# Patient Record
Sex: FEMALE | Race: WHITE | NOT HISPANIC OR LATINO | Employment: FULL TIME | URBAN - METROPOLITAN AREA
[De-identification: names, ages, dates, MRNs, and addresses within clinical notes are randomized per-mention and may not be internally consistent; named-entity substitution may affect disease eponyms.]

---

## 2022-08-31 ENCOUNTER — OFFICE VISIT (OUTPATIENT)
Dept: PULMONOLOGY | Facility: CLINIC | Age: 52
End: 2022-08-31
Payer: COMMERCIAL

## 2022-08-31 VITALS
DIASTOLIC BLOOD PRESSURE: 72 MMHG | HEIGHT: 67 IN | WEIGHT: 131.63 LBS | OXYGEN SATURATION: 99 % | BODY MASS INDEX: 20.66 KG/M2 | HEART RATE: 104 BPM | SYSTOLIC BLOOD PRESSURE: 105 MMHG

## 2022-08-31 DIAGNOSIS — J82.83 EOSINOPHILIC ASTHMA: ICD-10-CM

## 2022-08-31 DIAGNOSIS — J45.50 SEVERE PERSISTENT ASTHMA WITHOUT COMPLICATION: Primary | ICD-10-CM

## 2022-08-31 PROCEDURE — 99204 OFFICE O/P NEW MOD 45 MIN: CPT | Mod: S$GLB,,, | Performed by: INTERNAL MEDICINE

## 2022-08-31 PROCEDURE — 99204 PR OFFICE/OUTPT VISIT, NEW, LEVL IV, 45-59 MIN: ICD-10-PCS | Mod: S$GLB,,, | Performed by: INTERNAL MEDICINE

## 2022-08-31 PROCEDURE — 99999 PR PBB SHADOW E&M-NEW PATIENT-LVL III: ICD-10-PCS | Mod: PBBFAC,,, | Performed by: INTERNAL MEDICINE

## 2022-08-31 PROCEDURE — 99999 PR PBB SHADOW E&M-NEW PATIENT-LVL III: CPT | Mod: PBBFAC,,, | Performed by: INTERNAL MEDICINE

## 2022-08-31 RX ORDER — CELECOXIB 100 MG/1
100 CAPSULE ORAL 2 TIMES DAILY PRN
Qty: 60 CAPSULE | Refills: 5 | Status: SHIPPED | OUTPATIENT
Start: 2022-08-31

## 2022-08-31 RX ORDER — DICYCLOMINE HYDROCHLORIDE 20 MG/1
20 TABLET ORAL
COMMUNITY
Start: 2022-05-31

## 2022-08-31 RX ORDER — TIOTROPIUM BROMIDE INHALATION SPRAY 1.56 UG/1
2.5 SPRAY, METERED RESPIRATORY (INHALATION) DAILY
Qty: 12 G | Refills: 3 | Status: SHIPPED | OUTPATIENT
Start: 2022-08-31 | End: 2023-06-28

## 2022-08-31 RX ORDER — CLONAZEPAM 0.5 MG/1
0.5 TABLET ORAL NIGHTLY
COMMUNITY
Start: 2022-08-12 | End: 2022-11-10

## 2022-08-31 RX ORDER — PREDNISONE 20 MG/1
TABLET ORAL
Qty: 12 TABLET | Refills: 0 | Status: SHIPPED | OUTPATIENT
Start: 2022-08-31 | End: 2022-12-21 | Stop reason: SDUPTHER

## 2022-08-31 RX ORDER — ALBUTEROL SULFATE 90 UG/1
2 AEROSOL, METERED RESPIRATORY (INHALATION) EVERY 4 HOURS PRN
Qty: 108 G | Refills: 3 | Status: SHIPPED | OUTPATIENT
Start: 2022-08-31 | End: 2023-06-28 | Stop reason: SDUPTHER

## 2022-08-31 RX ORDER — OMEPRAZOLE 40 MG/1
40 CAPSULE, DELAYED RELEASE ORAL DAILY
COMMUNITY

## 2022-08-31 RX ORDER — IPRATROPIUM BROMIDE AND ALBUTEROL SULFATE 2.5; .5 MG/3ML; MG/3ML
3 SOLUTION RESPIRATORY (INHALATION) 4 TIMES DAILY
COMMUNITY
Start: 2022-07-16 | End: 2022-08-31 | Stop reason: SDUPTHER

## 2022-08-31 RX ORDER — FLUTICASONE PROPIONATE AND SALMETEROL XINAFOATE 230; 21 UG/1; UG/1
2 AEROSOL, METERED RESPIRATORY (INHALATION) 2 TIMES DAILY
Qty: 12 G | Refills: 11 | Status: SHIPPED | OUTPATIENT
Start: 2022-08-31 | End: 2022-11-28 | Stop reason: SDUPTHER

## 2022-08-31 RX ORDER — ALBUTEROL SULFATE 90 UG/1
2 AEROSOL, METERED RESPIRATORY (INHALATION) EVERY 6 HOURS PRN
COMMUNITY
Start: 2022-07-15 | End: 2022-08-31 | Stop reason: SDUPTHER

## 2022-08-31 RX ORDER — METHOCARBAMOL 500 MG/1
500-1000 TABLET, FILM COATED ORAL
COMMUNITY
Start: 2022-08-10 | End: 2023-12-28

## 2022-08-31 RX ORDER — IBUPROFEN 800 MG/1
800 TABLET ORAL EVERY 6 HOURS PRN
COMMUNITY
Start: 2022-05-10 | End: 2022-08-31

## 2022-08-31 RX ORDER — PEN NEEDLE, DIABETIC 31 GX5/16"
1 NEEDLE, DISPOSABLE MISCELLANEOUS 3 TIMES DAILY
COMMUNITY
Start: 2022-02-16

## 2022-08-31 RX ORDER — IPRATROPIUM BROMIDE AND ALBUTEROL SULFATE 2.5; .5 MG/3ML; MG/3ML
3 SOLUTION RESPIRATORY (INHALATION) EVERY 4 HOURS PRN
Qty: 240 EACH | Refills: 11 | Status: SHIPPED | OUTPATIENT
Start: 2022-08-31 | End: 2023-06-28 | Stop reason: SDUPTHER

## 2022-08-31 NOTE — PATIENT INSTRUCTIONS
Need controller-- advair covered - use 2 twice daily with spacer--     With good response to ipratropium would use spiriva 2 puffs daily    Use rescue with albuterol 2 puffs-- may need up to 4 puffs to mimic nebulizer dose-- use every 4 hrs as needed.  Use nebulizer as needed or albuterol puffer.    Use prednisone action plan for not controlled asthma-- needing excess albuterol and not responding well.     Would check blood work    Would check breathing test.      You have severe persistent asthma -- if cannot control would consider biologics.     Would recommend using celebrex in place of motrin as motrin may worsen asthma.    3 month recheck

## 2022-08-31 NOTE — PROGRESS NOTES
8/31/2022    Zunilda Phan  Patient Seen Years Ago And Here For Evaluation    Chief Complaint   Patient presents with    Asthma     Pt states that its acting up.    Cough     After using the nebulizer pt states that she coughs up white mucus.    Shortness of Breath     All the time. Pt states that she has a tight feeling like she can not breath.       HPI:works  , lives [place] last month,  uses nebulizer 4-6 times daily, [facility]  Singulair no help.  Sinuses not great with good sense smell.  Has dogs but allergic to cats and grasses.  Pt has nocturnal arousals 2/month.  Has daily symptoms.  The patient does not wheeze too much.  She has chest tightness and shortness of breath and cough in.    Patient has developed some gyn discomfort related inhaled steroid use in the past.     Eosinophils were 400 in 2019 on review of record.    The patient has nocturnal diffuse arthralgias the end discomfort.  She used uses Klonopin and ibuprofen for sleep.  She takes 100 mg of ibuprofen at bedtime.  She has not clearly noted that nonsteroidals induce or worsen sinuses or asthma.        PFSH:  No past medical history on file.      No past surgical history on file.  Social History     Tobacco Use    Smoking status: Never    Smokeless tobacco: Never     No family history on file.  Review of patient's allergies indicates:   Allergen Reactions    Corticosteroids (glucocorticoids) Other (See Comments)     Cramping/vag irritation       Performance Status:The patient's activity level is functions out of house.      Review of Systems:  a review of eleven systems covering constitutional, Eye, HEENT, Psych, Respiratory, Cardiac, GI, , Musculoskeletal, Endocrine, Dermatologic was negative except for pertinent findings as listed ABOVE and below: pertinent positive as above, rest is good     Exam:Comprehensive exam done. /72 (BP Location: Right arm,  "Patient Position: Sitting, BP Method: Medium (Automatic))   Pulse 104   Ht 5' 7" (1.702 m)   Wt 59.7 kg (131 lb 9.8 oz)   SpO2 99% Comment: room air at rest  BMI 20.61 kg/m²   Exam included Vitals as listed, and patient's appearance and affect and alertness and mood, oral exam for yeast and hygiene and pharynx lesions and Mallapatti (M) score, neck with inspection for jvd and masses and thyroid abnormalities and lymph nodes (supraclavicular and infraclavicular nodes and axillary also examined and noted if abn), chest exam included symmetry and effort and fremitus and percussion and auscultation, cardiac exam included rhythm and gallops and murmur and rubs and jvd and edema, abdominal exam for mass and hepatosplenomegaly and tenderness and hernias and bowel sounds, Musculoskeletal exam with muscle tone and posture and mobility/gait and  strength, and skin for rashes and cyanosis and pallor and turgor, extremity for clubbing.  Findings were normal except for pertinent findings listed below:   M1, chest is symmetric, no distress, normal percussion, normal fremitus and good normal breath sounds , noclubbingnor edema         Radiographs (ct chest and cxr) reviewed: view by direct vision  ct abd viewed directly and lung bases clear.    Labs reviewed            PFT will be done and results to be reviewed       Plan:  Clinical impression is apparently straight forward and impression with management as below.    Zunilda was seen today for asthma, cough and shortness of breath.    Diagnoses and all orders for this visit:    Severe persistent asthma without complication  -     fluticasone-salmeterol 230-21 mcg/dose (ADVAIR HFA) 230-21 mcg/actuation HFAA inhaler; Inhale 2 puffs into the lungs 2 (two) times daily. Controller  -     Complete PFT with bronchodilator; Future  -     albuterol (PROVENTIL/VENTOLIN HFA) 90 mcg/actuation inhaler; Inhale 2 puffs into the lungs every 4 (four) hours as needed for Wheezing or " Shortness of Breath.  -     tiotropium bromide (SPIRIVA RESPIMAT) 1.25 mcg/actuation inhaler; Inhale 2 puffs into the lungs once daily. Controller  -     albuterol-ipratropium (DUO-NEB) 2.5 mg-0.5 mg/3 mL nebulizer solution; Take 3 mLs by nebulization every 4 (four) hours as needed for Wheezing.  -     celecoxib (CELEBREX) 100 MG capsule; Take 1 capsule (100 mg total) by mouth 2 (two) times daily as needed for Pain.  -     predniSONE (DELTASONE) 20 MG tablet; Take one daily for 3 days and may repeat for shortness of breath.  -     CBC auto differential; Future    Eosinophilic asthma  -     fluticasone-salmeterol 230-21 mcg/dose (ADVAIR HFA) 230-21 mcg/actuation HFAA inhaler; Inhale 2 puffs into the lungs 2 (two) times daily. Controller  -     Complete PFT with bronchodilator; Future  -     albuterol (PROVENTIL/VENTOLIN HFA) 90 mcg/actuation inhaler; Inhale 2 puffs into the lungs every 4 (four) hours as needed for Wheezing or Shortness of Breath.  -     tiotropium bromide (SPIRIVA RESPIMAT) 1.25 mcg/actuation inhaler; Inhale 2 puffs into the lungs once daily. Controller  -     albuterol-ipratropium (DUO-NEB) 2.5 mg-0.5 mg/3 mL nebulizer solution; Take 3 mLs by nebulization every 4 (four) hours as needed for Wheezing.  -     celecoxib (CELEBREX) 100 MG capsule; Take 1 capsule (100 mg total) by mouth 2 (two) times daily as needed for Pain.  -     predniSONE (DELTASONE) 20 MG tablet; Take one daily for 3 days and may repeat for shortness of breath.  -     CBC auto differential; Future      Follow up in about 3 months (around 11/30/2022).    Discussed with patient above for education the following:      Patient Instructions   Need controller-- advair covered - use 2 twice daily with spacer--     With good response to ipratropium would use spiriva 2 puffs daily    Use rescue with albuterol 2 puffs-- may need up to 4 puffs to mimic nebulizer dose-- use every 4 hrs as needed.  Use nebulizer as needed or albuterol  jama.    Use prednisone action plan for not controlled asthma-- needing excess albuterol and not responding well.     Would check blood work    Would check breathing test.      You have severe persistent asthma -- if cannot control would consider biologics.     Would recommend using celebrex in place of motrin as motrin may worsen asthma.    3 month recheck

## 2022-11-28 ENCOUNTER — OFFICE VISIT (OUTPATIENT)
Dept: PULMONOLOGY | Facility: CLINIC | Age: 52
End: 2022-11-28
Payer: COMMERCIAL

## 2022-11-28 ENCOUNTER — LAB VISIT (OUTPATIENT)
Dept: LAB | Facility: HOSPITAL | Age: 52
End: 2022-11-28
Attending: INTERNAL MEDICINE
Payer: COMMERCIAL

## 2022-11-28 VITALS
SYSTOLIC BLOOD PRESSURE: 120 MMHG | WEIGHT: 141.13 LBS | HEIGHT: 67 IN | HEART RATE: 86 BPM | OXYGEN SATURATION: 98 % | DIASTOLIC BLOOD PRESSURE: 72 MMHG | BODY MASS INDEX: 22.15 KG/M2

## 2022-11-28 DIAGNOSIS — J45.909 STEROID-DEPENDENT ASTHMA, UNSPECIFIED ASTHMA SEVERITY, UNSPECIFIED WHETHER COMPLICATED, UNSPECIFIED WHETHER PERSISTENT: ICD-10-CM

## 2022-11-28 DIAGNOSIS — J82.83 EOSINOPHILIC ASTHMA: ICD-10-CM

## 2022-11-28 DIAGNOSIS — J45.50 SEVERE PERSISTENT ASTHMA WITHOUT COMPLICATION: ICD-10-CM

## 2022-11-28 DIAGNOSIS — J45.50 SEVERE PERSISTENT ASTHMA WITHOUT COMPLICATION: Primary | ICD-10-CM

## 2022-11-28 DIAGNOSIS — M85.80 OSTEOPENIA, UNSPECIFIED LOCATION: ICD-10-CM

## 2022-11-28 LAB
BASOPHILS # BLD AUTO: 0.14 K/UL (ref 0–0.2)
BASOPHILS NFR BLD: 1.3 % (ref 0–1.9)
DIFFERENTIAL METHOD: ABNORMAL
EOSINOPHIL # BLD AUTO: 0.5 K/UL (ref 0–0.5)
EOSINOPHIL NFR BLD: 4.2 % (ref 0–8)
ERYTHROCYTE [DISTWIDTH] IN BLOOD BY AUTOMATED COUNT: 11.7 % (ref 11.5–14.5)
HCT VFR BLD AUTO: 35.5 % (ref 37–48.5)
HGB BLD-MCNC: 11.7 G/DL (ref 12–16)
IMM GRANULOCYTES # BLD AUTO: 0.14 K/UL (ref 0–0.04)
IMM GRANULOCYTES NFR BLD AUTO: 1.3 % (ref 0–0.5)
LYMPHOCYTES # BLD AUTO: 2.1 K/UL (ref 1–4.8)
LYMPHOCYTES NFR BLD: 18.5 % (ref 18–48)
MCH RBC QN AUTO: 30.4 PG (ref 27–31)
MCHC RBC AUTO-ENTMCNC: 33 G/DL (ref 32–36)
MCV RBC AUTO: 92 FL (ref 82–98)
MONOCYTES # BLD AUTO: 0.9 K/UL (ref 0.3–1)
MONOCYTES NFR BLD: 8.5 % (ref 4–15)
NEUTROPHILS # BLD AUTO: 7.4 K/UL (ref 1.8–7.7)
NEUTROPHILS NFR BLD: 66.2 % (ref 38–73)
NRBC BLD-RTO: 0 /100 WBC
PLATELET # BLD AUTO: 321 K/UL (ref 150–450)
PMV BLD AUTO: 9.8 FL (ref 9.2–12.9)
RBC # BLD AUTO: 3.85 M/UL (ref 4–5.4)
WBC # BLD AUTO: 11.11 K/UL (ref 3.9–12.7)

## 2022-11-28 PROCEDURE — 99215 OFFICE O/P EST HI 40 MIN: CPT | Mod: S$GLB,,, | Performed by: INTERNAL MEDICINE

## 2022-11-28 PROCEDURE — 99215 PR OFFICE/OUTPT VISIT, EST, LEVL V, 40-54 MIN: ICD-10-PCS | Mod: S$GLB,,, | Performed by: INTERNAL MEDICINE

## 2022-11-28 PROCEDURE — 99999 PR PBB SHADOW E&M-EST. PATIENT-LVL IV: ICD-10-PCS | Mod: PBBFAC,,, | Performed by: INTERNAL MEDICINE

## 2022-11-28 PROCEDURE — 85025 COMPLETE CBC W/AUTO DIFF WBC: CPT | Performed by: INTERNAL MEDICINE

## 2022-11-28 PROCEDURE — 36415 COLL VENOUS BLD VENIPUNCTURE: CPT | Performed by: INTERNAL MEDICINE

## 2022-11-28 PROCEDURE — 99999 PR PBB SHADOW E&M-EST. PATIENT-LVL IV: CPT | Mod: PBBFAC,,, | Performed by: INTERNAL MEDICINE

## 2022-11-28 RX ORDER — FLUCONAZOLE 200 MG/1
200 TABLET ORAL DAILY
Qty: 7 TABLET | Refills: 0 | Status: SHIPPED | OUTPATIENT
Start: 2022-11-28 | End: 2022-12-05

## 2022-11-28 RX ORDER — FLUTICASONE FUROATE, UMECLIDINIUM BROMIDE AND VILANTEROL TRIFENATATE 200; 62.5; 25 UG/1; UG/1; UG/1
1 POWDER RESPIRATORY (INHALATION) DAILY
Qty: 60 EACH | Refills: 11 | Status: SHIPPED | OUTPATIENT
Start: 2022-11-28 | End: 2023-06-28

## 2022-11-28 RX ORDER — FLUTICASONE PROPIONATE AND SALMETEROL XINAFOATE 230; 21 UG/1; UG/1
2 AEROSOL, METERED RESPIRATORY (INHALATION) 2 TIMES DAILY
Qty: 36 G | Refills: 3 | Status: SHIPPED | OUTPATIENT
Start: 2022-11-28 | End: 2023-06-28

## 2022-11-28 NOTE — PROGRESS NOTES
11/28/2022    Zunilda Phan  Patient Seen Years Ago And Here For Evaluation    Chief Complaint   Patient presents with    Shortness of Breath     Pt states always     Cough     Pt states she coughs up white mucus          HPI:  11/28/2022-- used advair but not felt to effective in prevention  as needed freq steroids.  Pth having chest tightness -- no large benefit.  Has thick white mucous from lungs.    Has cramps feet-   Pt using samples controller.  Pt has taken 4 courses prednisone since last visit.   Pt had osteopenia on bone density [facility] 2 yrs ago.   Pt had not had premature change life.          8/231/2022 works  , lives [place] last month,  uses nebulizer 4-6 times daily, was seen [facility] a couple years ago, and dupixent recommended.  Singulair no help.  Sinuses not great with good sense smell.  Has dogs but allergic to cats and grasses.  Pt has nocturnal arousals 2/month.  Has daily symptoms.  The patient does not wheeze too much.  She has chest tightness and shortness of breath and cough in.    Patient has developed some gyn discomfort related inhaled steroid use in the past.     Eosinophils were 400 in 2019 on review of record.    The patient has nocturnal diffuse arthralgias.   She used uses Klonopin and ibuprofen for sleep.  She takes 100 mg of ibuprofen at bedtime.  She has not clearly noted that nonsteroidals induce or worsen sinuses or asthma.    Would check breathing test.      You have severe persistent asthma -- if cannot control would consider biologics.     Would recommend using celebrex in place of motrin as motrin may worsen asthma.    3 month recheck      PFSH:  No past medical history on file.      No past surgical history on file.  Social History     Tobacco Use    Smoking status: Never    Smokeless tobacco: Never     No family history on file.  Review of patient's allergies indicates:   Allergen Reactions    Corticosteroids (glucocorticoids) Other  "(See Comments)     Cramping/vag irritation       Performance Status:The patient's activity level is functions out of house.      Review of Systems:  a review of eleven systems covering constitutional, Eye, HEENT, Psych, Respiratory, Cardiac, GI, , Musculoskeletal, Endocrine, Dermatologic was negative except for pertinent findings as listed ABOVE and below: pertinent positive as above, rest is good     Exam:Comprehensive exam done. /72 (BP Location: Left arm, Patient Position: Sitting, BP Method: Medium (Automatic))   Pulse 86   Ht 5' 7" (1.702 m)   Wt 64 kg (141 lb 1.5 oz)   SpO2 98% Comment: room air at rest  BMI 22.10 kg/m²   Exam included Vitals as listed, and patient's appearance and affect and alertness and mood, oral exam for yeast and hygiene and pharynx lesions and Mallapatti (M) score, neck with inspection for jvd and masses and thyroid abnormalities and lymph nodes (supraclavicular and infraclavicular nodes and axillary also examined and noted if abn), chest exam included symmetry and effort and fremitus and percussion and auscultation, cardiac exam included rhythm and gallops and murmur and rubs and jvd and edema, abdominal exam for mass and hepatosplenomegaly and tenderness and hernias and bowel sounds, Musculoskeletal exam with muscle tone and posture and mobility/gait and  strength, and skin for rashes and cyanosis and pallor and turgor, extremity for clubbing.  Findings were normal except for pertinent findings listed below:   M1, chest is symmetric, no distress, normal percussion, normal fremitus and good normal breath sounds , no clubbing nor edema         Radiographs (ct chest and cxr) reviewed: view by direct vision  ct abd viewed directly and lung bases clear.    Labs reviewed            PFT will be done and results to be reviewed       Plan:  Clinical impression is apparently straight forward and impression with management as below.    Zunilda was seen today for shortness of " breath and cough.    Diagnoses and all orders for this visit:    Severe persistent asthma without complication  -     fluticasone-salmeterol 230-21 mcg/dose (ADVAIR HFA) 230-21 mcg/actuation HFAA inhaler; Inhale 2 puffs into the lungs 2 (two) times daily. Controller  -     CBC auto differential; Future  -     fluticasone-umeclidin-vilanter (TRELEGY ELLIPTA) 200-62.5-25 mcg inhaler; Inhale 1 puff into the lungs once daily.  -     dupilumab 300 mg/2 mL PnIj; Inject 300 mg into the skin every 14 (fourteen) days.    Eosinophilic asthma  -     fluticasone-salmeterol 230-21 mcg/dose (ADVAIR HFA) 230-21 mcg/actuation HFAA inhaler; Inhale 2 puffs into the lungs 2 (two) times daily. Controller    Osteopenia, unspecified location  -     DXA Bone Density Spine And Hip; Future  -     fluconazole (DIFLUCAN) 200 MG Tab; Take 1 tablet (200 mg total) by mouth once daily. for 7 days    Steroid-dependent asthma, unspecified asthma severity, unspecified whether complicated, unspecified whether persistent  -     DXA Bone Density Spine And Hip; Future  -     dupilumab 300 mg/2 mL PnIj; Inject 300 mg into the skin every 14 (fourteen) days.        Follow up in about 2 weeks (around 12/12/2022), or if symptoms worsen or fail to improve.    Discussed with patient above for education the following:      Patient Instructions   May use trelegy (samples) in place of advair-- we discuss and would recommend continuing mdi advair with space twice daily.      If yeast infection from steroids -- may use diflucan if any question.      Your asthma control looks steroid dependant but steroids ppt side effects limiting steroid dosing.    You have had osteopenia based on prior bone density-- would repeat bone density and try to avoid steroids best able.     Will dose dupixent 600 mg today - sampled--, then 300 every 2  wks.    Need 2 wks f/u for dupixent and 3 month.        Eval took 40 min

## 2022-11-28 NOTE — PATIENT INSTRUCTIONS
May use trelegy (samples) in place of advair-- we discuss and would recommend continuing mdi advair with space twice daily.      If yeast infection from steroids -- may use diflucan if any question.      Your asthma control looks steroid dependant but steroids ppt side effects limiting steroid dosing.    You have had osteopenia based on prior bone density-- would repeat bone density and try to avoid steroids best able.     Will dose dupixent 600 mg today - sampled--, then 300 every 2  wks.    Need 2 wks f/u for dupixent and 3 month.

## 2022-11-28 NOTE — PROGRESS NOTES
Administered dupixent starter dose sample  SQ.   NDC- 5218-3256-71  LOT- [id number]  Exp- 2023-10-31  Patient tolerated well. No bleeding at insertion site noted. Pain scale 0/10 with injection. 2 patient identifiers used (name, ), aseptic technique maintained.

## 2022-11-29 ENCOUNTER — SPECIALTY PHARMACY (OUTPATIENT)
Dept: PHARMACY | Facility: CLINIC | Age: 52
End: 2022-11-29
Payer: COMMERCIAL

## 2022-11-29 NOTE — TELEPHONE ENCOUNTER
Incoming call from patient returning call to [facility]. Informed pt that PA was submitted and once approved [facility] will follow up. Pt voiced understanding.

## 2022-11-29 NOTE — TELEPHONE ENCOUNTER
PA submitted via epic. Case ID: [id number]    Chelo, this is Ronda Ford with [facility].  We are working on your prescription that your doctor has sent us. We will be working with your insurance to get this approved for you. We will be calling you along the way with updates on your medication.  If you have any questions, you can reach us at [phone].    Welcome call outcome: Left voicemail

## 2022-12-01 NOTE — TELEPHONE ENCOUNTER
PA approved. CaseId:[id number];Status:Approved;Review Type:Prior Auth;Coverage Start Date:10/30/2022;Coverage End Date:05/28/2023    [facility] is OON. Pt  locked into filling at [facility].     Outgoing call to pt to inform  of approval and provided phone number to [facility] to reach out for delivery status.  Pt verbalized understanding and had no other questions.

## 2022-12-09 ENCOUNTER — TELEPHONE (OUTPATIENT)
Dept: PULMONOLOGY | Facility: CLINIC | Age: 52
End: 2022-12-09
Payer: COMMERCIAL

## 2022-12-09 NOTE — TELEPHONE ENCOUNTER
Returned call to pharmacy. Advised pharmacist ok to dispense a 90 day supply with 3 refills. TORB given.

## 2022-12-09 NOTE — TELEPHONE ENCOUNTER
----- Message from Guerline Sawant, Patient Care Assistant sent at 12/9/2022  3:53 PM CST -----  Contact: Prema Daley  [facility] is calling to get a qty clarification on med Dupixent.  [phone]  ref [phone]  thanks

## 2022-12-21 ENCOUNTER — OFFICE VISIT (OUTPATIENT)
Dept: PULMONOLOGY | Facility: CLINIC | Age: 52
End: 2022-12-21
Payer: COMMERCIAL

## 2022-12-21 VITALS
SYSTOLIC BLOOD PRESSURE: 124 MMHG | OXYGEN SATURATION: 99 % | BODY MASS INDEX: 22.52 KG/M2 | HEART RATE: 75 BPM | DIASTOLIC BLOOD PRESSURE: 76 MMHG | HEIGHT: 67 IN | WEIGHT: 143.5 LBS

## 2022-12-21 DIAGNOSIS — J82.83 EOSINOPHILIC ASTHMA: ICD-10-CM

## 2022-12-21 DIAGNOSIS — J45.50 SEVERE PERSISTENT ASTHMA WITHOUT COMPLICATION: Primary | ICD-10-CM

## 2022-12-21 PROCEDURE — 99213 PR OFFICE/OUTPT VISIT, EST, LEVL III, 20-29 MIN: ICD-10-PCS | Mod: S$GLB,,, | Performed by: INTERNAL MEDICINE

## 2022-12-21 PROCEDURE — 99999 PR PBB SHADOW E&M-EST. PATIENT-LVL III: ICD-10-PCS | Mod: PBBFAC,,, | Performed by: INTERNAL MEDICINE

## 2022-12-21 PROCEDURE — 99213 OFFICE O/P EST LOW 20 MIN: CPT | Mod: S$GLB,,, | Performed by: INTERNAL MEDICINE

## 2022-12-21 PROCEDURE — 99999 PR PBB SHADOW E&M-EST. PATIENT-LVL III: CPT | Mod: PBBFAC,,, | Performed by: INTERNAL MEDICINE

## 2022-12-21 RX ORDER — PREDNISONE 20 MG/1
TABLET ORAL
Qty: 12 TABLET | Refills: 0 | Status: SHIPPED | OUTPATIENT
Start: 2022-12-21 | End: 2023-12-28 | Stop reason: SDUPTHER

## 2022-12-21 NOTE — PROGRESS NOTES
12/21/2022    Zunilda Phan  Patient Seen Years Ago And Here For Evaluation    Chief Complaint   Patient presents with    Follow-up         HPI:    12/21/2022-- got 2nd dupixent recently. Has advair, no prednisone , no yeast.  Pt not certain dupixent very effective yet -- suspects she is better.      Eos were 500 nov 28th nov and on dec 13th,.needs neb same 400.     11/28/2022-- used advair but not felt to effective in prevention  as needed freq steroids.  Pth having chest tightness -- no large benefit.  Has thick white mucous from lungs.    Has cramps feet-   Pt using samples controller.  Pt has taken 4 courses prednisone since last visit.   Pt had osteopenia on bone density [facility] 2 yrs ago.   Pt had not had premature change life.    Patient Instructions   May use trelegy (samples) in place of advair-- we discuss and would recommend continuing mdi advair with space twice daily.      If yeast infection from steroids -- may use diflucan if any question.      Your asthma control looks steroid dependant but steroids ppt side effects limiting steroid dosing.    You have had osteopenia based on prior bone density-- would repeat bone density and try to avoid steroids best able.     Will dose dupixent 600 mg today - sampled--, then 300 every 2  wks.    Need 2 wks f/u for dupixent and 3 month.        8/231/2022 works  , [place] last month,  uses nebulizer 4-6 times daily, was seen [facility] a couple years ago, and dupixent recommended.  Singulair no help.  Sinuses not great with good sense smell.  Has dogs but allergic to cats and grasses.  Pt has nocturnal arousals 2/month.  Has daily symptoms.  The patient does not wheeze too much.  She has chest tightness and shortness of breath and cough in.    Patient has developed some gyn discomfort related inhaled steroid use in the past.     Eosinophils were 400 in 2019 on review of record.    The patient has nocturnal diffuse arthralgias.  "  She used uses Klonopin and ibuprofen for sleep.  She takes 100 mg of ibuprofen at bedtime.  She has not clearly noted that nonsteroidals induce or worsen sinuses or asthma.    Would check breathing test.      You have severe persistent asthma -- if cannot control would consider biologics.     Would recommend using celebrex in place of motrin as motrin may worsen asthma.    3 month recheck      PFSH:  No past medical history on file.      No past surgical history on file.  Social History     Tobacco Use    Smoking status: Never    Smokeless tobacco: Never     No family history on file.  Review of patient's allergies indicates:   Allergen Reactions    Corticosteroids (glucocorticoids) Other (See Comments)     Cramping/vag irritation       Performance Status:The patient's activity level is functions out of house.      Review of Systems:  a review of eleven systems covering constitutional, Eye, HEENT, Psych, Respiratory, Cardiac, GI, , Musculoskeletal, Endocrine, Dermatologic was negative except for pertinent findings as listed ABOVE and below: pertinent positive as above, rest is good     Exam:Comprehensive exam done. /76 (BP Location: Left arm, Patient Position: Sitting, BP Method: Medium (Automatic))   Pulse 75   Ht 5' 7" (1.702 m)   Wt 65.1 kg (143 lb 8.3 oz)   SpO2 99% Comment: On room air at rest  BMI 22.48 kg/m²   Exam included Vitals as listed, and patient's appearance and affect and alertness and mood, oral exam for yeast and hygiene and pharynx lesions and Mallapatti (M) score, neck with inspection for jvd and masses and thyroid abnormalities and lymph nodes (supraclavicular and infraclavicular nodes and axillary also examined and noted if abn), chest exam included symmetry and effort and fremitus and percussion and auscultation, cardiac exam included rhythm and gallops and murmur and rubs and jvd and edema, abdominal exam for mass and hepatosplenomegaly and tenderness and hernias and bowel " sounds, Musculoskeletal exam with muscle tone and posture and mobility/gait and  strength, and skin for rashes and cyanosis and pallor and turgor, extremity for clubbing.  Findings were normal except for pertinent findings listed below:   M1, chest is symmetric, no distress, normal percussion, normal fremitus and good normal breath sounds , no clubbing nor edema         Radiographs (ct chest and cxr) reviewed: view by direct vision  ct abd viewed directly and lung bases clear.    Labs reviewed            PFT will be done and results to be reviewed       Plan:  Clinical impression is apparently straight forward and impression with management as below.    Zunilda was seen today for follow-up.    Diagnoses and all orders for this visit:    Severe persistent asthma without complication  -     predniSONE (DELTASONE) 20 MG tablet; Take one daily for 3 days and may repeat for shortness of breath.    Eosinophilic asthma  -     predniSONE (DELTASONE) 20 MG tablet; Take one daily for 3 days and may repeat for shortness of breath.            Follow up in about 6 months (around 6/21/2023).    Discussed with patient above for education the following:      Patient Instructions   Would continue management -- re eval 6 months.      Eval took 40 min

## 2023-05-15 DIAGNOSIS — J45.50 SEVERE PERSISTENT ASTHMA WITHOUT COMPLICATION: ICD-10-CM

## 2023-05-15 DIAGNOSIS — J45.909 STEROID-DEPENDENT ASTHMA, UNSPECIFIED ASTHMA SEVERITY, UNSPECIFIED WHETHER COMPLICATED, UNSPECIFIED WHETHER PERSISTENT: ICD-10-CM

## 2023-05-17 ENCOUNTER — TELEPHONE (OUTPATIENT)
Dept: PHARMACY | Facility: CLINIC | Age: 53
End: 2023-05-17
Payer: COMMERCIAL

## 2023-05-17 NOTE — TELEPHONE ENCOUNTER
Hello, this is Ronda Ford, clinical pharmacist with [facility] that is part of your care team.  We have begun working on your prescription that your doctor has sent us. Our next steps include:     Working with your insurance company to obtain approval for your medication  Working with you to ensure your medication is affordable     We will be calling you along the way with updates on your medication but if you have any concerns or receive information that you would like to discuss please reach us at [phone].    Welcome call outcome: Left voicemail    New order received to obtain PA renewal expiring on 5/28/2023.    Insurance presented question needing pt's reponse:  Has the patient responded to therapy, as determined by the prescriber? Please Note: Examples of a response to Dupixent therapy are decreased asthma exacerbations; decreased asthma symptoms; decreased hospitalizations, emergency department visits, or urgent care visits due to asthma; decreased requirement for oral corticosteroid therapy.    Please verify updated address.

## 2023-06-28 ENCOUNTER — OFFICE VISIT (OUTPATIENT)
Dept: PULMONOLOGY | Facility: CLINIC | Age: 53
End: 2023-06-28
Payer: COMMERCIAL

## 2023-06-28 VITALS
WEIGHT: 150.13 LBS | DIASTOLIC BLOOD PRESSURE: 81 MMHG | BODY MASS INDEX: 23.56 KG/M2 | OXYGEN SATURATION: 99 % | HEART RATE: 85 BPM | HEIGHT: 67 IN | SYSTOLIC BLOOD PRESSURE: 125 MMHG

## 2023-06-28 DIAGNOSIS — J44.89 ASTHMA-COPD OVERLAP SYNDROME: Primary | ICD-10-CM

## 2023-06-28 DIAGNOSIS — J45.50 SEVERE PERSISTENT ASTHMA WITHOUT COMPLICATION: ICD-10-CM

## 2023-06-28 DIAGNOSIS — J82.83 EOSINOPHILIC ASTHMA: ICD-10-CM

## 2023-06-28 DIAGNOSIS — R06.02 SHORTNESS OF BREATH: ICD-10-CM

## 2023-06-28 PROCEDURE — 99215 PR OFFICE/OUTPT VISIT, EST, LEVL V, 40-54 MIN: ICD-10-PCS | Mod: S$GLB,,, | Performed by: INTERNAL MEDICINE

## 2023-06-28 PROCEDURE — 99999 PR PBB SHADOW E&M-EST. PATIENT-LVL IV: ICD-10-PCS | Mod: PBBFAC,,, | Performed by: INTERNAL MEDICINE

## 2023-06-28 PROCEDURE — 99215 OFFICE O/P EST HI 40 MIN: CPT | Mod: S$GLB,,, | Performed by: INTERNAL MEDICINE

## 2023-06-28 PROCEDURE — 99999 PR PBB SHADOW E&M-EST. PATIENT-LVL IV: CPT | Mod: PBBFAC,,, | Performed by: INTERNAL MEDICINE

## 2023-06-28 RX ORDER — IPRATROPIUM BROMIDE AND ALBUTEROL SULFATE 2.5; .5 MG/3ML; MG/3ML
3 SOLUTION RESPIRATORY (INHALATION) EVERY 4 HOURS PRN
Qty: 240 EACH | Refills: 11 | Status: SHIPPED | OUTPATIENT
Start: 2023-06-28 | End: 2023-12-28

## 2023-06-28 RX ORDER — BECLOMETHASONE DIPROPIONATE HFA 80 UG/1
160 AEROSOL, METERED RESPIRATORY (INHALATION) 2 TIMES DAILY
Qty: 10.6 G | Refills: 11 | Status: SHIPPED | OUTPATIENT
Start: 2023-06-28 | End: 2023-12-28

## 2023-06-28 RX ORDER — TIOTROPIUM BROMIDE AND OLODATEROL 3.124; 2.736 UG/1; UG/1
2 SPRAY, METERED RESPIRATORY (INHALATION) DAILY
Qty: 4 G | Refills: 11 | Status: SHIPPED | OUTPATIENT
Start: 2023-06-28 | End: 2023-12-28

## 2023-06-28 RX ORDER — ALBUTEROL SULFATE 90 UG/1
2 AEROSOL, METERED RESPIRATORY (INHALATION) EVERY 4 HOURS PRN
Qty: 108 G | Refills: 3 | Status: SHIPPED | OUTPATIENT
Start: 2023-06-28 | End: 2023-12-28

## 2023-06-28 NOTE — PATIENT INSTRUCTIONS
You did much better with ipratropium than albuterol alone.   You should be on long lasting anti cholinergic-- sprivia prototype.    Use stiolto 2 daily--has spiriva and long lasting albuterol medication    Need to get on inhaled steroids-- trial q vikram 2 twice daily--  use with spacer and rinse mouth.        Use albuterol 2-4 puffs in place of nebulized therapy as needed.      May consider another biologics.    Use prednisone if asthma not controlled    Check eosinophils in future.  Eos have been high in past-- over 200 is high..    Would check spirometry in future.....      Dispensed spacer today.      You may have had abnormal sweat testing in past -- ct 2011 viewed and no bronchiectasis seen on ct abd..  could do ct chest if not doing well......

## 2023-06-28 NOTE — PROGRESS NOTES
6/28/2023    Zunilda Phan  Patient Seen Years Ago And Here For Evaluation    Chief Complaint   Patient presents with    Follow-up     6 month f/u  - discuss biologics - feels dupixent didn't work          HPI:  6/28/2023  Pt stopped dupixent after 12 wks after 3k copay.  No prednisone but not doing well.  Uses neb 3-4 times daily, not using controller as had cramps feet/legs and vaginal irritation.      Pt was at [facility] for 5 days -- told had asthma and did pft   No nocturnal arousal  -- sob daily.      12/21/2022-- got 2nd dupixent recently. Has advair, no prednisone , no yeast.  Pt not certain dupixent very effective yet -- suspects she is better.      Eos were 500 nov 28th nov and on dec 13th,.needs neb same 400.   Patient Instructions   Would continue management -- re eval 6 months.    11/28/2022-- used advair but not felt to effective in prevention  as needed freq steroids.  Pth having chest tightness -- no large benefit.  Has thick white mucous from lungs.    Has cramps feet-   Pt using samples controller.  Pt has taken 4 courses prednisone since last visit.   Pt had osteopenia on bone density [facility] 2 yrs ago.   Pt had not had premature change life.    Patient Instructions   May use trelegy (samples) in place of advair-- we discuss and would recommend continuing mdi advair with space twice daily.      If yeast infection from steroids -- may use diflucan if any question.      Your asthma control looks steroid dependant but steroids ppt side effects limiting steroid dosing.    You have had osteopenia based on prior bone density-- would repeat bone density and try to avoid steroids best able.     Will dose dupixent 600 mg today - sampled--, then 300 every 2  wks.    Need 2 wks f/u for dupixent and 3 month.        8/231/2022 works  , [place] last [place],  uses nebulizer 4-6 times daily, was seen [facility] a couple years ago, and dupixent recommended.  Singulair  "no help.  Sinuses not great with good sense smell.  Has dogs but allergic to cats and grasses.  Pt has nocturnal arousals 2/month.  Has daily symptoms.  The patient does not wheeze too much.  She has chest tightness and shortness of breath and cough in.    Patient has developed some gyn discomfort related inhaled steroid use in the past.     Eosinophils were 400 in 2019 on review of record.    The patient has nocturnal diffuse arthralgias.   She used uses Klonopin and ibuprofen for sleep.  She takes 100 mg of ibuprofen at bedtime.  She has not clearly noted that nonsteroidals induce or worsen sinuses or asthma.    Would check breathing test.      You have severe persistent asthma -- if cannot control would consider biologics.     Would recommend using celebrex in place of motrin as motrin may worsen asthma.    3 month recheck      PFSH:  No past medical history on file.      No past surgical history on file.  Social History     Tobacco Use    Smoking status: Never    Smokeless tobacco: Never     No family history on file.  Review of patient's allergies indicates:   Allergen Reactions    Corticosteroids (glucocorticoids) Other (See Comments)     Cramping/vag irritation       Performance Status:The patient's activity level is functions out of house.      Review of Systems:  a review of eleven systems covering constitutional, Eye, HEENT, Psych, Respiratory, Cardiac, GI, , Musculoskeletal, Endocrine, Dermatologic was negative except for pertinent findings as listed ABOVE and below: pertinent positive as above, rest is good     Exam:Comprehensive exam done. /81 (BP Location: Left arm, Patient Position: Sitting, BP Method: Medium (Automatic))   Pulse 85   Ht 5' 7" (1.702 m)   Wt 68.1 kg (150 lb 2.1 oz)   SpO2 99% Comment: on room air at rest  BMI 23.51 kg/m²   Exam included Vitals as listed, and patient's appearance and affect and alertness and mood, oral exam for yeast and hygiene and pharynx lesions and " Mallapatti (M) score, neck with inspection for jvd and masses and thyroid abnormalities and lymph nodes (supraclavicular and infraclavicular nodes and axillary also examined and noted if abn), chest exam included symmetry and effort and fremitus and percussion and auscultation, cardiac exam included rhythm and gallops and murmur and rubs and jvd and edema, abdominal exam for mass and hepatosplenomegaly and tenderness and hernias and bowel sounds, Musculoskeletal exam with muscle tone and posture and mobility/gait and  strength, and skin for rashes and cyanosis and pallor and turgor, extremity for clubbing.  Findings were normal except for pertinent findings listed below:   M1, chest is symmetric, no distress, normal percussion, normal fremitus and good normal breath sounds , no clubbing nor edema         Radiographs (ct chest and cxr) reviewed: view by direct vision  ct abd viewed directly and lung bases clear.    Labs reviewed            PFT will be done and results to be reviewed       Plan:  Clinical impression is apparently straight forward and impression with management as below.    Zunilda was seen today for follow-up.    Diagnoses and all orders for this visit:    Asthma-COPD overlap syndrome  -     tiotropium-olodateroL (STIOLTO RESPIMAT) 2.5-2.5 mcg/actuation Mist; Inhale 2 puffs into the lungs once daily. Controller    Severe persistent asthma without complication  -     beclomethasone (QVAR REDIHALER) 80 mcg/actuation inhaler; Inhale 2 puffs into the lungs 2 (two) times daily.  -     albuterol (PROVENTIL/VENTOLIN HFA) 90 mcg/actuation inhaler; Inhale 2 puffs into the lungs every 4 (four) hours as needed for Wheezing or Shortness of Breath.  -     albuterol-ipratropium (DUO-NEB) 2.5 mg-0.5 mg/3 mL nebulizer solution; Take 3 mLs by nebulization every 4 (four) hours as needed for Wheezing.  -     CBC auto differential; Future  -     Spirometry with/without bronchodilator; Future  -      tiotropium-olodateroL (STIOLTO RESPIMAT) 2.5-2.5 mcg/actuation Mist; Inhale 2 puffs into the lungs once daily. Controller    Eosinophilic asthma  -     albuterol (PROVENTIL/VENTOLIN HFA) 90 mcg/actuation inhaler; Inhale 2 puffs into the lungs every 4 (four) hours as needed for Wheezing or Shortness of Breath.  -     albuterol-ipratropium (DUO-NEB) 2.5 mg-0.5 mg/3 mL nebulizer solution; Take 3 mLs by nebulization every 4 (four) hours as needed for Wheezing.              Follow up in about 6 months (around 12/28/2023), or if symptoms worsen or fail to improve.    Discussed with patient above for education the following:      Patient Instructions   You did much better with ipratropium than albuterol alone.   You should be on long lasting anti cholinergic-- sprivia prototype.    Use stiolto 2 daily--has spiriva and long lasting albuterol medication    Need to get on inhaled steroids-- trial q vikram 2 twice daily--  use with spacer and rinse mouth.        Use albuterol 2-4 puffs in place of nebulized therapy as needed.      May consider another biologics.    Use prednisone if asthma not controlled    Check eosinophils in future.  Eos have been high in past-- over 200 is high..    Would check spirometry in future.....      Dispensed spacer today.               Parminder took 50 min

## 2023-09-26 ENCOUNTER — HOSPITAL ENCOUNTER (OUTPATIENT)
Dept: RADIOLOGY | Facility: HOSPITAL | Age: 53
Discharge: HOME | End: 2023-09-26
Attending: INTERNAL MEDICINE
Payer: COMMERCIAL

## 2023-09-26 ENCOUNTER — HOSPITAL ENCOUNTER (OUTPATIENT)
Dept: PULMONOLOGY | Facility: HOSPITAL | Age: 53
Discharge: HOME | End: 2023-09-26
Attending: INTERNAL MEDICINE
Payer: COMMERCIAL

## 2023-09-26 DIAGNOSIS — R06.02 SHORTNESS OF BREATH: ICD-10-CM

## 2023-09-26 DIAGNOSIS — J45.50 SEVERE PERSISTENT ASTHMA WITHOUT COMPLICATION: ICD-10-CM

## 2023-09-26 LAB
ERV LLN: -16449.09
ERV PRE REF: 161.1 %
ERV REF: 0.91
FEF 25 75 CHG: 22.3 %
FEF 25 75 LLN: 1.42
FEF 25 75 POST REF: 137.5 %
FEF 25 75 PRE REF: 112.4 %
FEF 25 75 REF: 2.64
FET100 CHG: -21.9 %
FEV1 CHG: 4.7 %
FEV1 FVC CHG: 4.1 %
FEV1 FVC LLN: 68
FEV1 FVC POST REF: 102.1 %
FEV1 FVC PRE REF: 98.1 %
FEV1 FVC REF: 79
FEV1 LLN: 2.22
FEV1 POST REF: 112.2 %
FEV1 PRE REF: 107.1 %
FEV1 REF: 2.89
FRCPLETH LLN: 2.04
FRCPLETH PREREF: 110 %
FRCPLETH REF: 2.87
FVC CHG: 0.7 %
FVC LLN: 2.82
FVC POST REF: 109.1 %
FVC PRE REF: 108.4 %
FVC REF: 3.67
MVV LLN: 93
MVV PRE REF: 105.4 %
MVV REF: 110
PEF CHG: 15.6 %
PEF LLN: 5.14
PEF POST REF: 103 %
PEF PRE REF: 89.1 %
PEF REF: 7.01
POST FEF 25 75: 3.64 L/S (ref 1.42–4.25)
POST FET 100: 8.43 SEC
POST FEV1 FVC: 81.11 % (ref 67.99–89.22)
POST FEV1: 3.25 L (ref 2.22–3.54)
POST FVC: 4 L (ref 2.82–4.55)
POST PEF: 7.22 L/S (ref 5.14–8.89)
PRE ERV: 1.46 L (ref -16449.09–16450.91)
PRE FEF 25 75: 2.97 L/S (ref 1.42–4.25)
PRE FET 100: 10.8 SEC
PRE FEV1 FVC: 77.95 % (ref 67.99–89.22)
PRE FEV1: 3.1 L (ref 2.22–3.54)
PRE FRC PL: 3.15 L (ref 2.04–3.69)
PRE FVC: 3.98 L (ref 2.82–4.55)
PRE MVV: 115.86 L/MIN (ref 93.4–126.37)
PRE PEF: 6.25 L/S (ref 5.14–8.89)
PRE RV: 1.69 L (ref 1.38–2.54)
PRE TLC: 5.67 L (ref 4.45–6.43)
RAW LLN: 3.06
RAW PRE REF: 73 %
RAW PRE: 2.23 CMH2O*S/L (ref 3.06–3.06)
RAW REF: 3.06
RV LLN: 1.38
RV PRE REF: 86.3 %
RV REF: 1.96
RVTLC LLN: 28
RVTLC PRE REF: 79.3 %
RVTLC PRE: 29.87 % (ref 28.07–47.25)
RVTLC REF: 38
TLC LLN: 4.45
TLC PRE REF: 104.1 %
TLC REF: 5.44
VC LLN: 2.82
VC PRE REF: 108.4 %
VC PRE: 3.98 L (ref 2.82–4.55)
VC REF: 3.67

## 2023-09-26 PROCEDURE — 94060 EVALUATION OF WHEEZING: CPT | Mod: 26,,, | Performed by: INTERNAL MEDICINE

## 2023-09-26 PROCEDURE — 71250 CT THORAX DX C-: CPT | Mod: TC

## 2023-09-26 PROCEDURE — 94726 PLETHYSMOGRAPHY LUNG VOLUMES: CPT | Mod: 26,,, | Performed by: INTERNAL MEDICINE

## 2023-09-26 PROCEDURE — 71250 CT CHEST WITHOUT CONTRAST: ICD-10-PCS | Mod: 26,,, | Performed by: RADIOLOGY

## 2023-09-26 PROCEDURE — 94010 BREATHING CAPACITY TEST: CPT | Mod: XB

## 2023-09-26 PROCEDURE — 94060 EVALUATION OF WHEEZING: CPT

## 2023-09-26 PROCEDURE — 94726 PLETHYSMOGRAPHY LUNG VOLUMES: CPT

## 2023-09-26 PROCEDURE — 94726 PULM FUNCT TST PLETHYSMOGRAP: ICD-10-PCS | Mod: 26,,, | Performed by: INTERNAL MEDICINE

## 2023-09-26 PROCEDURE — 71250 CT THORAX DX C-: CPT | Mod: 26,,, | Performed by: RADIOLOGY

## 2023-09-26 PROCEDURE — 94060 PR EVAL OF BRONCHOSPASM: ICD-10-PCS | Mod: 26,,, | Performed by: INTERNAL MEDICINE

## 2023-09-26 RX ORDER — ALBUTEROL SULFATE 2.5 MG/.5ML
SOLUTION RESPIRATORY (INHALATION)
Status: DISPENSED
Start: 2023-09-26 | End: 2023-09-26

## 2023-09-26 NOTE — PROGRESS NOTES
Notify patient CT scan is viewed by direct vision by Dr. Escobedo.  Nodules looks very small likely not significant.  Will review at follow-up.  No change in plan.  Old CT of chest, if available, would be useful.

## 2023-09-27 ENCOUNTER — PATIENT MESSAGE (OUTPATIENT)
Dept: PULMONOLOGY | Facility: CLINIC | Age: 53
End: 2023-09-27
Payer: COMMERCIAL

## 2023-12-28 ENCOUNTER — OFFICE VISIT (OUTPATIENT)
Dept: PULMONOLOGY | Facility: CLINIC | Age: 53
End: 2023-12-28
Payer: COMMERCIAL

## 2023-12-28 VITALS
HEIGHT: 67 IN | BODY MASS INDEX: 23.45 KG/M2 | WEIGHT: 149.38 LBS | DIASTOLIC BLOOD PRESSURE: 81 MMHG | OXYGEN SATURATION: 98 % | SYSTOLIC BLOOD PRESSURE: 126 MMHG | HEART RATE: 77 BPM

## 2023-12-28 DIAGNOSIS — B37.31 YEAST VAGINITIS: Primary | ICD-10-CM

## 2023-12-28 DIAGNOSIS — J82.83 EOSINOPHILIC ASTHMA: ICD-10-CM

## 2023-12-28 DIAGNOSIS — J45.50 SEVERE PERSISTENT ASTHMA WITHOUT COMPLICATION: ICD-10-CM

## 2023-12-28 PROCEDURE — 99214 OFFICE O/P EST MOD 30 MIN: CPT | Mod: S$GLB,,, | Performed by: INTERNAL MEDICINE

## 2023-12-28 PROCEDURE — 99999 PR PBB SHADOW E&M-EST. PATIENT-LVL III: CPT | Mod: PBBFAC,,, | Performed by: INTERNAL MEDICINE

## 2023-12-28 RX ORDER — NITROFURANTOIN 25; 75 MG/1; MG/1
100 CAPSULE ORAL 2 TIMES DAILY
COMMUNITY
Start: 2023-12-27

## 2023-12-28 RX ORDER — CETIRIZINE HYDROCHLORIDE 10 MG/1
10 TABLET ORAL
COMMUNITY
Start: 2023-09-26

## 2023-12-28 RX ORDER — IPRATROPIUM BROMIDE AND ALBUTEROL SULFATE 2.5; .5 MG/3ML; MG/3ML
3 SOLUTION RESPIRATORY (INHALATION) EVERY 6 HOURS PRN
Qty: 120 EACH | Refills: 11 | Status: SHIPPED | OUTPATIENT
Start: 2023-12-28 | End: 2023-12-28 | Stop reason: SDUPTHER

## 2023-12-28 RX ORDER — ALBUTEROL SULFATE 90 UG/1
2 AEROSOL, METERED RESPIRATORY (INHALATION) EVERY 4 HOURS PRN
Qty: 108 G | Refills: 3 | Status: SHIPPED | OUTPATIENT
Start: 2023-12-28 | End: 2023-12-28 | Stop reason: SDUPTHER

## 2023-12-28 RX ORDER — IPRATROPIUM BROMIDE AND ALBUTEROL SULFATE 2.5; .5 MG/3ML; MG/3ML
3 SOLUTION RESPIRATORY (INHALATION) EVERY 6 HOURS PRN
COMMUNITY
End: 2023-12-28 | Stop reason: SDUPTHER

## 2023-12-28 RX ORDER — ALBUTEROL SULFATE 90 UG/1
2 AEROSOL, METERED RESPIRATORY (INHALATION) EVERY 4 HOURS PRN
Qty: 108 G | Refills: 3 | Status: SHIPPED | OUTPATIENT
Start: 2023-12-28

## 2023-12-28 RX ORDER — PREDNISONE 20 MG/1
TABLET ORAL
Qty: 21 TABLET | Refills: 0 | Status: SHIPPED | OUTPATIENT
Start: 2023-12-28 | End: 2023-12-28 | Stop reason: SDUPTHER

## 2023-12-28 RX ORDER — PREDNISONE 20 MG/1
TABLET ORAL
Qty: 21 TABLET | Refills: 0 | Status: SHIPPED | OUTPATIENT
Start: 2023-12-28

## 2023-12-28 RX ORDER — IPRATROPIUM BROMIDE AND ALBUTEROL SULFATE 2.5; .5 MG/3ML; MG/3ML
3 SOLUTION RESPIRATORY (INHALATION) EVERY 6 HOURS PRN
Qty: 120 EACH | Refills: 11 | Status: SHIPPED | OUTPATIENT
Start: 2023-12-28

## 2023-12-28 RX ORDER — MOMETASONE FUROATE AND FORMOTEROL FUMARATE DIHYDRATE 100; 5 UG/1; UG/1
2 AEROSOL RESPIRATORY (INHALATION) 2 TIMES DAILY
Qty: 13 G | Refills: 11 | Status: SHIPPED | OUTPATIENT
Start: 2023-12-28 | End: 2023-12-28 | Stop reason: SDUPTHER

## 2023-12-28 RX ORDER — FLUCONAZOLE 200 MG/1
200 TABLET ORAL DAILY
Qty: 7 TABLET | Refills: 0 | Status: SHIPPED | OUTPATIENT
Start: 2023-12-28 | End: 2024-01-04

## 2023-12-28 RX ORDER — MOMETASONE FUROATE AND FORMOTEROL FUMARATE DIHYDRATE 100; 5 UG/1; UG/1
2 AEROSOL RESPIRATORY (INHALATION) 2 TIMES DAILY
Qty: 13 G | Refills: 11 | Status: SHIPPED | OUTPATIENT
Start: 2023-12-28 | End: 2024-12-27

## 2023-12-28 NOTE — PROGRESS NOTES
12/28/2023    Zunidla Phan  Patient Seen Years Ago And Here For Evaluation    Chief Complaint   Patient presents with    Follow-up         HPI:    12/28/2023  pt still having struggling , has mucous in lungs. Having allergies ppt sinus problems.   Uses prednisone twice a yr, has daily chest tightness and mucous produced thick white-- nebulizer helps less over time , uses neb bid to qid....  pt had chr fatigue and stress -- was in bad relationship and needed therapy.  Poor sleep related to change.  Pt had had chr anxiety well controlled with  nightly klonopin..  no ics now --just nebs and albuterol      6/28/2023  Pt stopped dupixent after 12 wks after 3k copay.  No prednisone but not doing well.  Uses neb 3-4 times daily, not using controller as had cramps feet/legs and vaginal irritation.      Pt was at [facility] for 5 days -- told had asthma and did pft   No nocturnal arousal  -- sob daily.    Patient Instructions   You did much better with ipratropium than albuterol alone.   You should be on long lasting anti cholinergic-- sprivia prototype.    Use stiolto 2 daily--has spiriva and long lasting albuterol medication    Need to get on inhaled steroids-- trial q vikram 2 twice daily--  use with spacer and rinse mouth.        Use albuterol 2-4 puffs in place of nebulized therapy as needed.      May consider another biologics.    Use prednisone if asthma not controlled    Check eosinophils in future.  Eos have been high in past-- over 200 is high..    Would check spirometry in future.....      Dispensed spacer today.  12/21/2022-- got 2nd dupixent recently. Has advair, no prednisone , no yeast.  Pt not certain dupixent very effective yet -- suspects she is better.      Eos were 500 nov 28th nov and on dec 13th,.needs neb same 400.   Patient Instructions   Would continue management -- re eval 6 months.    11/28/2022-- used advair but not felt to effective in prevention  as needed freq steroids.  Pth having chest  tightness -- no large benefit.  Has thick white mucous from lungs.    Has cramps feet-   Pt using samples controller.  Pt has taken 4 courses prednisone since last visit.   Pt had osteopenia on bone density [facility] 2 yrs ago.   Pt had not had premature change life.    Patient Instructions   May use trelegy (samples) in place of advair-- we discuss and would recommend continuing mdi advair with space twice daily.      If yeast infection from steroids -- may use diflucan if any question.      Your asthma control looks steroid dependant but steroids ppt side effects limiting steroid dosing.    You have had osteopenia based on prior bone density-- would repeat bone density and try to avoid steroids best able.     Will dose dupixent 600 mg today - sampled--, then 300 every 2  wks.    Need 2 wks f/u for dupixent and 3 month.        8/231/2022 works  , [place] last [place],  uses nebulizer 4-6 times daily, was seen [facility] a couple years ago, and dupixent recommended.  Singulair no help.  Sinuses not great with good sense smell.  Has dogs but allergic to cats and grasses.  Pt has nocturnal arousals 2/month.  Has daily symptoms.  The patient does not wheeze too much.  She has chest tightness and shortness of breath and cough in.    Patient has developed some gyn discomfort related inhaled steroid use in the past.     Eosinophils were 400 in 2019 on review of record.    The patient has nocturnal diffuse arthralgias.   She used uses Klonopin and ibuprofen for sleep.  She takes 100 mg of ibuprofen at bedtime.  She has not clearly noted that nonsteroidals induce or worsen sinuses or asthma.    Would check breathing test.      You have severe persistent asthma -- if cannot control would consider biologics.     Would recommend using celebrex in place of motrin as motrin may worsen asthma.    3 month recheck      PFSH:  No past medical history on file.      No past surgical history on  "file.  Social History     Tobacco Use    Smoking status: Never    Smokeless tobacco: Never     No family history on file.  Review of patient's allergies indicates:   Allergen Reactions    Corticosteroids (glucocorticoids) Other (See Comments)     Cramping/vag irritation       Performance Status:The patient's activity level is functions out of house.      Review of Systems:  a review of eleven systems covering constitutional, Eye, HEENT, Psych, Respiratory, Cardiac, GI, , Musculoskeletal, Endocrine, Dermatologic was negative except for pertinent findings as listed ABOVE and below: pertinent positive as above, rest is good     Exam:Comprehensive exam done. /81 (BP Location: Right arm, Patient Position: Sitting, BP Method: Small (Automatic))   Pulse 77   Ht 5' 7" (1.702 m)   Wt 67.8 kg (149 lb 5.8 oz)   SpO2 98% Comment: on room air at rest  BMI 23.39 kg/m²   Exam included Vitals as listed, and patient's appearance and affect and alertness and mood, oral exam for yeast and hygiene and pharynx lesions and Mallapatti (M) score, neck with inspection for jvd and masses and thyroid abnormalities and lymph nodes (supraclavicular and infraclavicular nodes and axillary also examined and noted if abn), chest exam included symmetry and effort and fremitus and percussion and auscultation, cardiac exam included rhythm and gallops and murmur and rubs and jvd and edema, abdominal exam for mass and hepatosplenomegaly and tenderness and hernias and bowel sounds, Musculoskeletal exam with muscle tone and posture and mobility/gait and  strength, and skin for rashes and cyanosis and pallor and turgor, extremity for clubbing.  Findings were normal except for pertinent findings listed below:   M1, chest is symmetric, no distress, normal percussion, normal fremitus and good normal breath sounds , no clubbing nor edema         Radiographs (ct chest and cxr) reviewed: view by direct vision  ct abd viewed directly and lung " bases clear.    Labs reviewed            PFT will be done and results to be reviewed       Plan:  Clinical impression is apparently straight forward and impression with management as below.    Zunilda was seen today for follow-up.    Diagnoses and all orders for this visit:    Severe persistent asthma without complication  -     Discontinue: mometasone-formoterol (DULERA) 100-5 mcg/actuation HFAA; Inhale 2 puffs into the lungs 2 (two) times a day. Controller  -     Discontinue: predniSONE (DELTASONE) 20 MG tablet; Take one daily for 3 days and may repeat for shortness of breath.  -     Discontinue: albuterol-ipratropium (DUO-NEB) 2.5 mg-0.5 mg/3 mL nebulizer solution; Take 3 mLs by nebulization every 6 (six) hours as needed for Wheezing. Rescue  -     Discontinue: albuterol (PROVENTIL/VENTOLIN HFA) 90 mcg/actuation inhaler; Inhale 2 puffs into the lungs every 4 (four) hours as needed for Wheezing or Shortness of Breath.  -     albuterol (PROVENTIL/VENTOLIN HFA) 90 mcg/actuation inhaler; Inhale 2 puffs into the lungs every 4 (four) hours as needed for Wheezing or Shortness of Breath.  -     albuterol-ipratropium (DUO-NEB) 2.5 mg-0.5 mg/3 mL nebulizer solution; Take 3 mLs by nebulization every 6 (six) hours as needed for Wheezing. Rescue  -     mometasone-formoterol (DULERA) 100-5 mcg/actuation HFAA; Inhale 2 puffs into the lungs 2 (two) times a day. Controller  -     predniSONE (DELTASONE) 20 MG tablet; Take one daily for 3 days and may repeat for shortness of breath.  -     CBC Auto Differential; Future    Eosinophilic asthma  -     Discontinue: mometasone-formoterol (DULERA) 100-5 mcg/actuation HFAA; Inhale 2 puffs into the lungs 2 (two) times a day. Controller  -     Discontinue: predniSONE (DELTASONE) 20 MG tablet; Take one daily for 3 days and may repeat for shortness of breath.  -     Discontinue: albuterol-ipratropium (DUO-NEB) 2.5 mg-0.5 mg/3 mL nebulizer solution; Take 3 mLs by nebulization every 6 (six)  hours as needed for Wheezing. Rescue  -     Discontinue: albuterol (PROVENTIL/VENTOLIN HFA) 90 mcg/actuation inhaler; Inhale 2 puffs into the lungs every 4 (four) hours as needed for Wheezing or Shortness of Breath.  -     albuterol (PROVENTIL/VENTOLIN HFA) 90 mcg/actuation inhaler; Inhale 2 puffs into the lungs every 4 (four) hours as needed for Wheezing or Shortness of Breath.  -     albuterol-ipratropium (DUO-NEB) 2.5 mg-0.5 mg/3 mL nebulizer solution; Take 3 mLs by nebulization every 6 (six) hours as needed for Wheezing. Rescue  -     mometasone-formoterol (DULERA) 100-5 mcg/actuation HFAA; Inhale 2 puffs into the lungs 2 (two) times a day. Controller  -     predniSONE (DELTASONE) 20 MG tablet; Take one daily for 3 days and may repeat for shortness of breath.  -     CBC Auto Differential; Future                Follow up in about 3 months (around 3/28/2024), or if symptoms worsen or fail to improve.    Discussed with patient above for education the following:      Patient Instructions   Nucala or Fasenra work somewhat similar --- or Tezspire -- may control severe asthma.     Return 3 months to eval    Use inhaled steroids and long lasting albuterol  -- use dulera with spacer    Ct chest from September viewed ----  looked very good-- no suggestion of DAMASO    Use prednisone as needed.   Check eosinophil count..E

## 2023-12-28 NOTE — PATIENT INSTRUCTIONS
Steven or Tye work somewhat similar --- or Tezspire -- may control severe asthma.     Return 3 months to eval    Use inhaled steroids and long lasting albuterol  -- use dulera with spacer-- will give spacer    Ct chest from September viewed ----  looked very good-- no suggestion of DAMASO    Use prednisone as needed.   Check eosinophil count..

## 2024-11-25 ENCOUNTER — OFFICE VISIT (OUTPATIENT)
Dept: PULMONOLOGY | Facility: CLINIC | Age: 54
End: 2024-11-25
Payer: COMMERCIAL

## 2024-11-25 VITALS
DIASTOLIC BLOOD PRESSURE: 86 MMHG | HEART RATE: 86 BPM | WEIGHT: 161.94 LBS | BODY MASS INDEX: 25.42 KG/M2 | OXYGEN SATURATION: 97 % | SYSTOLIC BLOOD PRESSURE: 133 MMHG | HEIGHT: 67 IN

## 2024-11-25 DIAGNOSIS — J45.50 SEVERE PERSISTENT ASTHMA WITHOUT COMPLICATION: ICD-10-CM

## 2024-11-25 DIAGNOSIS — J82.83 EOSINOPHILIC ASTHMA: ICD-10-CM

## 2024-11-25 PROCEDURE — 99214 OFFICE O/P EST MOD 30 MIN: CPT | Mod: S$GLB,,, | Performed by: INTERNAL MEDICINE

## 2024-11-25 PROCEDURE — 99999 PR PBB SHADOW E&M-EST. PATIENT-LVL IV: CPT | Mod: PBBFAC,,, | Performed by: INTERNAL MEDICINE

## 2024-11-25 RX ORDER — SUCRALFATE 1 G/1
1 TABLET ORAL
COMMUNITY

## 2024-11-25 RX ORDER — MOMETASONE FUROATE AND FORMOTEROL FUMARATE DIHYDRATE 100; 5 UG/1; UG/1
2 AEROSOL RESPIRATORY (INHALATION) 2 TIMES DAILY
Qty: 39 G | Refills: 3 | Status: SHIPPED | OUTPATIENT
Start: 2024-11-25 | End: 2025-11-25

## 2024-11-25 RX ORDER — POLYETHYLENE GLYCOL 3350, SODIUM SULFATE, POTASSIUM CHLORIDE, MAGNESIUM SULFATE, AND SODIUM CHLORIDE FOR ORAL SOLUTION 178.7-7.3G
KIT ORAL
COMMUNITY
Start: 2024-10-31

## 2024-11-25 RX ORDER — IPRATROPIUM BROMIDE AND ALBUTEROL SULFATE 2.5; .5 MG/3ML; MG/3ML
3 SOLUTION RESPIRATORY (INHALATION) EVERY 6 HOURS PRN
Qty: 120 EACH | Refills: 11 | Status: SHIPPED | OUTPATIENT
Start: 2024-11-25

## 2024-11-25 RX ORDER — PREDNISONE 20 MG/1
TABLET ORAL
Qty: 21 TABLET | Refills: 0 | Status: SHIPPED | OUTPATIENT
Start: 2024-11-25

## 2024-11-25 RX ORDER — PANTOPRAZOLE SODIUM 40 MG/1
1 TABLET, DELAYED RELEASE ORAL DAILY
COMMUNITY

## 2024-11-25 RX ORDER — ERGOCALCIFEROL 1.25 MG/1
1 CAPSULE ORAL
COMMUNITY

## 2024-11-25 NOTE — PATIENT INSTRUCTIONS
You continue to have severe asthma  - evaluation at [facility] indicated asthma - severe.     You need to stay on dulera.    Would see back in 3 months and consider biologic at that  time..    Use prednisone action plan for severe symptoms....

## 2024-11-25 NOTE — PROGRESS NOTES
11/25/2024    Zunilda Phan  Patient Seen Years Ago And Here For Evaluation    Chief Complaint   Patient presents with    Follow-up    Asthma         HPI:  11/25/2024 pt was doing well til August and sept -- improved with prednisone azithro or doxy -- 2 times..    No controller.  Use of inhaled steroids precipitates yeast infections unusually.     Nebs and albuterol --      Pt did use dulera in April  --     Mom had mi -- had 3 stents placed.       Pt  had methacholine test and bronch and ct at [facility]-- 2020 12/28/2023  pt still having struggling , has mucous in lungs. Having allergies ppt sinus problems.   Uses prednisone twice a yr, has daily chest tightness and mucous produced thick white-- nebulizer helps less over time , uses neb bid to qid....  pt had chr fatigue and stress -- was in bad relationship and needed therapy.  Poor sleep related to change.  Pt had had chr anxiety well controlled with  nightly klonopin..  no ics now --just nebs and albuterol  Patient Instructions   Steven or Gara work somewhat similar --- or Tezspire -- may control severe asthma.     Return 3 months to [facility]    Use inhaled steroids and long lasting albuterol  -- use dulera with spacer    Ct chest from September viewed ----  looked very good-- no suggestion of DAMASO    Use prednisone as needed.   Check eosinophil count.    6/28/2023  Pt stopped dupixent after 12 wks after 3k copay.  No prednisone but not doing well.  Uses neb 3-4 times daily, not using controller as had cramps feet/legs and vaginal irritation.      Pt was at [facility] for 5 days -- told had asthma and did pft   No nocturnal arousal  -- sob daily.    Patient Instructions   You did much better with ipratropium than albuterol alone.   You should be on long lasting anti cholinergic-- sprivia prototype.    Use stiolto 2 daily--has spiriva and long lasting albuterol medication    Need to get on inhaled steroids-- trial q vikram 2 twice daily--  use with  spacer and rinse mouth.        Use albuterol 2-4 puffs in place of nebulized therapy as needed.      May consider another biologics.    Use prednisone if asthma not controlled    Check eosinophils in future.  Eos have been high in past-- over 200 is high..    Would check spirometry in future.....      Dispensed spacer today.  12/21/2022-- got 2nd dupixent recently. Has advair, no prednisone , no yeast.  Pt not certain dupixent very effective yet -- suspects she is better.      Eos were 500 nov 28th nov and on dec 13th,.needs neb same 400.   Patient Instructions   Would continue management -- re eval 6 months.    11/28/2022-- used advair but not felt to effective in prevention  as needed freq steroids.  Pth having chest tightness -- no large benefit.  Has thick white mucous from lungs.    Has cramps feet-   Pt using samples controller.  Pt has taken 4 courses prednisone since last visit.   Pt had osteopenia on bone density [facility] 2 yrs ago.   Pt had not had premature change life.    Patient Instructions   May use trelegy (samples) in place of advair-- we discuss and would recommend continuing mdi advair with space twice daily.      If yeast infection from steroids -- may use diflucan if any question.      Your asthma control looks steroid dependant but steroids ppt side effects limiting steroid dosing.    You have had osteopenia based on prior bone density-- would repeat bone density and try to avoid steroids best able.     Will dose dupixent 600 mg today - sampled--, then 300 every 2  wks.    Need 2 wks f/u for dupixent and 3 month.        8/231/2022 works  , [place],  uses nebulizer 4-6 times daily, was seen [facility] a couple years ago, and dupixent recommended.  Singulair no help.  Sinuses not great with good sense smell.  Has dogs but allergic to cats and grasses.  Pt has nocturnal arousals 2/month.  Has daily symptoms.  The patient does not wheeze too much.   "She has chest tightness and shortness of breath and cough in.    Patient has developed some gyn discomfort related inhaled steroid use in the past.     Eosinophils were 400 in 2019 on review of record.    The patient has nocturnal diffuse arthralgias.   She used uses Klonopin and ibuprofen for sleep.  She takes 100 mg of ibuprofen at bedtime.  She has not clearly noted that nonsteroidals induce or worsen sinuses or asthma.    Would check breathing test.      You have severe persistent asthma -- if cannot control would consider biologics.     Would recommend using celebrex in place of motrin as motrin may worsen asthma.    3 month recheck      PFSH:  No past medical history on file.      No past surgical history on file.  Social History     Tobacco Use    Smoking status: Never    Smokeless tobacco: Never     No family history on file.  Review of patient's allergies indicates:   Allergen Reactions    Corticosteroids (glucocorticoids) Other (See Comments)     Cramping/vag irritation       Performance Status:The patient's activity level is functions out of house.      Review of Systems:  a review of eleven systems covering constitutional, Eye, HEENT, Psych, Respiratory, Cardiac, GI, , Musculoskeletal, Endocrine, Dermatologic was negative except for pertinent findings as listed ABOVE and below: pertinent positive as above, rest is good     Exam:Comprehensive exam done. /86 (BP Location: Left arm, Patient Position: Sitting)   Pulse 86   Ht 5' 7" (1.702 m)   Wt 73.5 kg (161 lb 14.9 oz)   LMP  (LMP Unknown)   SpO2 97% Comment: on room air at rest  BMI 25.36 kg/m²   Exam included Vitals as listed, and patient's appearance and affect and alertness and mood, oral exam for yeast and hygiene and pharynx lesions and Mallapatti (M) score, neck with inspection for jvd and masses and thyroid abnormalities and lymph nodes (supraclavicular and infraclavicular nodes and axillary also examined and noted if abn), chest exam " included symmetry and effort and fremitus and percussion and auscultation, cardiac exam included rhythm and gallops and murmur and rubs and jvd and edema, abdominal exam for mass and hepatosplenomegaly and tenderness and hernias and bowel sounds, Musculoskeletal exam with muscle tone and posture and mobility/gait and  strength, and skin for rashes and cyanosis and pallor and turgor, extremity for clubbing.  Findings were normal except for pertinent findings listed below:   M1, chest is symmetric, no distress, normal percussion, normal fremitus and good normal breath sounds , no clubbing nor edema         Radiographs (ct chest and cxr) reviewed: view by direct vision  ct abd viewed directly and lung bases clear.    Labs reviewed            PFT  Spirometry bronchodilator lung volume by body box were performed September 26, 2023. Spirometry falls within normal  range. There was no significant improvement spirometry following bronchodilator. Lung volumes are also within normal  range. Clinical correlation recommended.  (Physician Final: 09/26/2023 09:49AM, Electronically signed by Dr. Rajeev Escobedo)      Plan:  Clinical impression is apparently straight forward and impression with management as below.    Zunilda was seen today for follow-up and asthma.    Diagnoses and all orders for this visit:    Severe persistent asthma without complication  -     mometasone-formoterol (DULERA) 100-5 mcg/actuation HFAA; Inhale 2 puffs into the lungs 2 (two) times a day. Controller  -     predniSONE (DELTASONE) 20 MG tablet; Take one daily for 3 days and may repeat for shortness of breath.    Eosinophilic asthma  -     predniSONE (DELTASONE) 20 MG tablet; Take one daily for 3 days and may repeat for shortness of breath.                  Follow up in about 3 months (around 2/25/2025), or if symptoms worsen or fail to improve.    Discussed with patient above for education the following:      Patient Instructions   You continue to have  severe asthma  - evaluation at [facility] indicated asthma - severe.     You need to stay on dulera.    Would see back in 3 months and consider biologic at that  time..    Use prednisone action plan for severe symptoms....        E evaluation took 30 minutes

## 2025-02-25 ENCOUNTER — RESULTS FOLLOW-UP (OUTPATIENT)
Dept: PULMONOLOGY | Facility: CLINIC | Age: 55
End: 2025-02-25

## 2025-02-25 ENCOUNTER — LAB VISIT (OUTPATIENT)
Dept: LAB | Facility: HOSPITAL | Age: 55
End: 2025-02-25
Attending: INTERNAL MEDICINE
Payer: COMMERCIAL

## 2025-02-25 ENCOUNTER — OFFICE VISIT (OUTPATIENT)
Dept: PULMONOLOGY | Facility: CLINIC | Age: 55
End: 2025-02-25
Payer: COMMERCIAL

## 2025-02-25 VITALS
SYSTOLIC BLOOD PRESSURE: 126 MMHG | WEIGHT: 162.5 LBS | BODY MASS INDEX: 25.51 KG/M2 | OXYGEN SATURATION: 96 % | DIASTOLIC BLOOD PRESSURE: 79 MMHG | HEIGHT: 67 IN | HEART RATE: 102 BPM

## 2025-02-25 DIAGNOSIS — J82.83 EOSINOPHILIC ASTHMA: Primary | ICD-10-CM

## 2025-02-25 DIAGNOSIS — J82.83 EOSINOPHILIC ASTHMA: ICD-10-CM

## 2025-02-25 DIAGNOSIS — J45.50 SEVERE PERSISTENT ASTHMA WITHOUT COMPLICATION: ICD-10-CM

## 2025-02-25 LAB
BASOPHILS # BLD AUTO: 0.09 K/UL (ref 0–0.2)
BASOPHILS NFR BLD: 1 % (ref 0–1.9)
DIFFERENTIAL METHOD BLD: ABNORMAL
EOSINOPHIL # BLD AUTO: 0.5 K/UL (ref 0–0.5)
EOSINOPHIL NFR BLD: 5.9 % (ref 0–8)
ERYTHROCYTE [DISTWIDTH] IN BLOOD BY AUTOMATED COUNT: 11.9 % (ref 11.5–14.5)
HCT VFR BLD AUTO: 36.3 % (ref 37–48.5)
HGB BLD-MCNC: 11.6 G/DL (ref 12–16)
IMM GRANULOCYTES # BLD AUTO: 0.04 K/UL (ref 0–0.04)
IMM GRANULOCYTES NFR BLD AUTO: 0.5 % (ref 0–0.5)
LYMPHOCYTES # BLD AUTO: 2.3 K/UL (ref 1–4.8)
LYMPHOCYTES NFR BLD: 26.4 % (ref 18–48)
MCH RBC QN AUTO: 29.1 PG (ref 27–31)
MCHC RBC AUTO-ENTMCNC: 32 G/DL (ref 32–36)
MCV RBC AUTO: 91 FL (ref 82–98)
MONOCYTES # BLD AUTO: 0.7 K/UL (ref 0.3–1)
MONOCYTES NFR BLD: 8.2 % (ref 4–15)
NEUTROPHILS # BLD AUTO: 5.1 K/UL (ref 1.8–7.7)
NEUTROPHILS NFR BLD: 58 % (ref 38–73)
NRBC BLD-RTO: 0 /100 WBC
PLATELET # BLD AUTO: 356 K/UL (ref 150–450)
PMV BLD AUTO: 9.7 FL (ref 9.2–12.9)
RBC # BLD AUTO: 3.98 M/UL (ref 4–5.4)
WBC # BLD AUTO: 8.85 K/UL (ref 3.9–12.7)

## 2025-02-25 PROCEDURE — 99999 PR PBB SHADOW E&M-EST. PATIENT-LVL III: CPT | Mod: PBBFAC,,, | Performed by: INTERNAL MEDICINE

## 2025-02-25 PROCEDURE — 36415 COLL VENOUS BLD VENIPUNCTURE: CPT | Performed by: INTERNAL MEDICINE

## 2025-02-25 PROCEDURE — 99213 OFFICE O/P EST LOW 20 MIN: CPT | Mod: S$GLB,,, | Performed by: INTERNAL MEDICINE

## 2025-02-25 PROCEDURE — 85025 COMPLETE CBC W/AUTO DIFF WBC: CPT | Performed by: INTERNAL MEDICINE

## 2025-02-25 RX ORDER — BENRALIZUMAB 30 MG/ML
30 INJECTION, SOLUTION SUBCUTANEOUS
Qty: 1 EACH | Refills: 1 | Status: SHIPPED | OUTPATIENT
Start: 2025-03-11

## 2025-02-25 NOTE — PROGRESS NOTES
2/25/2025    Zunilda Phan  Patient Seen Years Ago And Here For Evaluation    Chief Complaint   Patient presents with    Follow-up    Asthma         HPI:    2/25/2025 pt still having cough, uses dulera and has used prednisone twice last yr.  Pt had eos 400 last checked ,prior to today, 11/2024.no other issues.  Still coughs with scant mucous sl yellow occasionally.    11/25/2024 pt was doing well til August and sept -- improved with prednisone azithro or doxy -- 2 times..  No controller.  Use of inhaled steroids precipitates yeast infections unusually.   Nebs and albuterol --    Pt did use dulera in April  --   Mom had mi -- had 3 stents placed.     Pt  had methacholine test and bronch and ct at [facility]-- 2020   Patient Instructions   You continue to have severe asthma  - evaluation at [facility] indicated asthma - severe.     You need to stay on dulera.    Would see back in 3 months and consider biologic at that  time..    Use prednisone action plan for severe symptoms....  12/28/2023  pt still having struggling , has mucous in lungs. Having allergies ppt sinus problems.   Uses prednisone twice a yr, has daily chest tightness and mucous produced thick white-- nebulizer helps less over time , uses neb bid to qid....  pt had chr fatigue and stress -- was in bad relationship and needed therapy.  Poor sleep related to change.  Pt had had chr anxiety well controlled with  nightly klonopin..  no ics now --just nebs and albuterol  Patient Instructions   Nucala or Fasenra work somewhat similar --- or Tezspire -- may control severe asthma.     Return 3 months to eval    Use inhaled steroids and long lasting albuterol  -- use dulera with spacer    Ct chest from September viewed ----  looked very good-- no suggestion of DAMASO    Use prednisone as needed.   Check eosinophil count.    6/28/2023  Pt stopped dupixent after 12 wks after 3k copay.  No prednisone but not doing well.  Uses neb 3-4 times daily, not using  controller as had cramps feet/legs and vaginal irritation.      Pt was at [facility] for 5 days -- told had asthma and did pft   No nocturnal arousal  -- sob daily.    Patient Instructions   You did much better with ipratropium than albuterol alone.   You should be on long lasting anti cholinergic-- sprivia prototype.    Use stiolto 2 daily--has spiriva and long lasting albuterol medication    Need to get on inhaled steroids-- trial q vikram 2 twice daily--  use with spacer and rinse mouth.        Use albuterol 2-4 puffs in place of nebulized therapy as needed.      May consider another biologics.    Use prednisone if asthma not controlled    Check eosinophils in future.  Eos have been high in past-- over 200 is high..    Would check spirometry in future.....      Dispensed spacer today.  12/21/2022-- got 2nd dupixent recently. Has advair, no prednisone , no yeast.  Pt not certain dupixent very effective yet -- suspects she is better.      Eos were 500 nov 28th nov and on dec 13th,.needs neb same 400.   Patient Instructions   Would continue management -- re eval 6 months.    11/28/2022-- used advair but not felt to effective in prevention  as needed freq steroids.  Pth having chest tightness -- no large benefit.  Has thick white mucous from lungs.    Has cramps feet-   Pt using samples controller.  Pt has taken 4 courses prednisone since last visit.   Pt had osteopenia on bone density [facility] 2 yrs ago.   Pt had not had premature change life.    Patient Instructions   May use trelegy (samples) in place of advair-- we discuss and would recommend continuing mdi advair with space twice daily.      If yeast infection from steroids -- may use diflucan if any question.      Your asthma control looks steroid dependant but steroids ppt side effects limiting steroid dosing.    You have had osteopenia based on prior bone density-- would repeat bone density and try to avoid steroids best able.     Will dose dupixent  "600 mg today - sampled--, then 300 every 2  wks.    Need 2 wks f/u for dupixent and 3 month.        8/231/2022 works  , [place],  uses nebulizer 4-6 times daily, was seen [facility] a couple years ago, and dupixent recommended.  Singulair no help.  Sinuses not great with good sense smell.  Has dogs but allergic to cats and grasses.  Pt has nocturnal arousals 2/month.  Has daily symptoms.  The patient does not wheeze too much.  She has chest tightness and shortness of breath and cough in.    Patient has developed some gyn discomfort related inhaled steroid use in the past.     Eosinophils were 400 in 2019 on review of record.    The patient has nocturnal diffuse arthralgias.   She used uses Klonopin and ibuprofen for sleep.  She takes 100 mg of ibuprofen at bedtime.  She has not clearly noted that nonsteroidals induce or worsen sinuses or asthma.    Would check breathing test.      You have severe persistent asthma -- if cannot control would consider biologics.     Would recommend using celebrex in place of motrin as motrin may worsen asthma.    3 month recheck      PFSH:  No past medical history on file.      No past surgical history on file.  Social History     Tobacco Use    Smoking status: Never    Smokeless tobacco: Never     No family history on file.  Review of patient's allergies indicates:   Allergen Reactions    Corticosteroids (glucocorticoids) Other (See Comments)     Cramping/vag irritation       Performance Status:The patient's activity level is functions out of house.      Review of Systems:  a review of eleven systems covering constitutional, Eye, HEENT, Psych, Respiratory, Cardiac, GI, , Musculoskeletal, Endocrine, Dermatologic was negative except for pertinent findings as listed ABOVE and below: pertinent positive as above, rest is good     Exam:Comprehensive exam done. /79 (BP Location: Left arm, Patient Position: Sitting)   Pulse 102   Ht 5' 7" " (1.702 m)   Wt 73.7 kg (162 lb 7.7 oz)   LMP  (LMP Unknown)   SpO2 96% Comment: on room air at rest  BMI 25.45 kg/m²   Exam included Vitals as listed, and patient's appearance and affect and alertness and mood, oral exam for yeast and hygiene and pharynx lesions and Mallapatti (M) score, neck with inspection for jvd and masses and thyroid abnormalities and lymph nodes (supraclavicular and infraclavicular nodes and axillary also examined and noted if abn), chest exam included symmetry and effort and fremitus and percussion and auscultation, cardiac exam included rhythm and gallops and murmur and rubs and jvd and edema, abdominal exam for mass and hepatosplenomegaly and tenderness and hernias and bowel sounds, Musculoskeletal exam with muscle tone and posture and mobility/gait and  strength, and skin for rashes and cyanosis and pallor and turgor, extremity for clubbing.  Findings were normal except for pertinent findings listed below:   M1, chest is symmetric, no distress, normal percussion, normal fremitus and good normal breath sounds , no clubbing nor edema         Radiographs (ct chest and cxr) reviewed: view by direct vision  ct abd viewed directly and lung bases clear.    Labs reviewed            PFT  Spirometry bronchodilator lung volume by body box were performed September 26, 2023. Spirometry falls within normal  range. There was no significant improvement spirometry following bronchodilator. Lung volumes are also within normal  range. Clinical correlation recommended.  (Physician Final: 09/26/2023 09:49AM, Electronically signed by Dr. Rajeev Escobedo)      Plan:  Clinical impression is apparently straight forward and impression with management as below.    Zunilda was seen today for follow-up and asthma.    Diagnoses and all orders for this visit:    Eosinophilic asthma  -     CBC auto differential; Future  -     benralizumab (FASENRA PEN) 30 mg/mL AtIn; Inject 30 mg into the skin every 28 days.  -      benralizumab 30 mg/mL AtIn; Inject 30 mg into the skin every 8 weeks.    Severe persistent asthma without complication  -     benralizumab (FASENRA PEN) 30 mg/mL AtIn; Inject 30 mg into the skin every 28 days.  -     benralizumab 30 mg/mL AtIn; Inject 30 mg into the skin every 8 weeks.                    Follow up in about 3 months (around 5/25/2025), or if symptoms worsen or fail to improve.    Discussed with patient above for education the following:      Patient Instructions     You have had severe persistent  asthma -- eosinophils have been 400 range.   You have used dulera regular and used prednisone twice in last yr.      will dose fasenra now , every 4 weeks for 2 more doses, then every 8 wksl

## 2025-02-25 NOTE — PROGRESS NOTES
Patient is here for her uploading Fasenra injection 30 mg/mL. 2 patient identifiers used (Name and ). SQ injection given into the left abdomen.  No redness, swelling, bleeding, or reaction noted to injection site.  Pt tolerated well. Enrollment form signed.    NDC: 9153-1512-94  LOT#: [id number]  Expiration: 2027

## 2025-02-25 NOTE — PATIENT INSTRUCTIONS
You have had severe persistent  asthma -- eosinophils have been 400 range.   You have used dulera regular and used prednisone twice in last yr.      will dose fasenra now , every 4 weeks for 2 more doses, then every 8 wksl

## 2025-03-07 ENCOUNTER — TELEPHONE (OUTPATIENT)
Dept: PULMONOLOGY | Facility: CLINIC | Age: 55
End: 2025-03-07
Payer: COMMERCIAL

## 2025-03-07 NOTE — TELEPHONE ENCOUNTER
----- Message from Lylette sent at 3/7/2025 12:50 PM CST -----  Regarding: Pt advice  Contact: [phone]  Rx Refill/RequestIs this a Refill or New Rx:  New RxRx Name and Strength:  benralizumab (FASENRA PEN) 30 mg/mL AtIn Preferred Pharmacy with phone [facility] Information: [facility] They will reach out to prvider. Please call  [phone]

## 2025-03-10 ENCOUNTER — TELEPHONE (OUTPATIENT)
Dept: PULMONOLOGY | Facility: CLINIC | Age: 55
End: 2025-03-10
Payer: COMMERCIAL

## 2025-03-19 ENCOUNTER — TELEPHONE (OUTPATIENT)
Dept: PULMONOLOGY | Facility: CLINIC | Age: 55
End: 2025-03-19
Payer: COMMERCIAL

## 2025-03-19 NOTE — TELEPHONE ENCOUNTER
----- Message from Charlette Patel sent at 3/17/2025  1:38 PM CDT -----  Regarding: RE: prior auth??  Contact: self  [phone]  Hi Mary!PA was already approved through 9/3/2025 for Fasenra (CaseId:[id number]). Both loading dose and maintenance doses were sent to [facility] (receipt confirmed by pharmacy on Rx's). Attempted to contact pt to see if she received the Rx, as there is a paid claim on 3/10 but had to leave a voicemail.Thank you,Amanda Mckeon, PharmDClinical PharmacistOchsner Specialty Pharmacy(P) [phone](F) [phone]  ----- Message -----  From: Ector Escobedo PharmD  Sent: 3/17/2025   1:27 PM CDT  To: Mary Donovan; Amanda Mckeon PharmD  Subject: RE: prior auth??                                 This has already been approved and sent to [employer] on 3/10/25.I'm unsure of why [facility] is reaching out for this information. Amanda can you see if patient has received this medication? Thanks,Ector Escobedo, PharmDClinical Pharmacist/Supervisor Kwabena Johny bernarda [place], [place] [phone]  ----- Message -----  From: Mary Donovan  Sent: 3/17/2025  10:12 AM CDT  To: Ector Escobedo PharmD  Subject: prior auth??                                     I thought this was already handled and went to [facility]?  Please advise  ----- Message -----  From: Danuta Levine  Sent: 3/17/2025  10:05 AM CDT  To: Gregg GARAY Staff    Type:  Needs Medical AdviceWho Called: PamalaSymptoms (please be specific): medication How long has patient had these symptoms:  Pharmacy name and phone #: [facility] - [place], [place] - [facility] [address]Srlrd: [phone] Fax: [phone] Would the patient rather a call back or a response via [facility]? Call back Best Call Back Number:  [phone] Information: patient called in this morning stating [facility] needs a patient level authorization for the medication Fasenra. She would like a call  back as well.

## 2025-03-21 ENCOUNTER — TELEPHONE (OUTPATIENT)
Dept: PULMONOLOGY | Facility: CLINIC | Age: 55
End: 2025-03-21
Payer: COMMERCIAL

## 2025-03-21 DIAGNOSIS — J82.83 EOSINOPHILIC ASTHMA: ICD-10-CM

## 2025-03-21 DIAGNOSIS — J45.50 SEVERE PERSISTENT ASTHMA WITHOUT COMPLICATION: ICD-10-CM

## 2025-03-21 NOTE — TELEPHONE ENCOUNTER
----- Message from Pharmacist Amanda sent at 3/21/2025 10:57 AM CDT -----  Henry Gonsales, I have sent both medication Rxs (maintenance dose and loading dose) and previously clarified with [facility] which one is the loading dose. Per note on 3/18: Spoke with representative, Radha SHIRLEY at [facility]. Notified representative processed claim is incorrect - pt needs the remainder of two loading doses for 28 day supply. Representative stated 24-48 hrs to process. No further authorization required.Is there something further I can help with? Thanks,Amanda Mckeon, PharmDClinical PharmacistOchsner Specialty [facility](P) [phone](F) [phone]  ----- Message -----  From: Ilda Shetty MA  Sent: 3/21/2025  10:08 AM CDT  To: Amanda Mckeon, PharmD      ----- Message -----  From: Pro Quesada  Sent: 3/21/2025   9:28 AM CDT  To: Gregg GARAY Staff    Type:  Pharmacy Calling to Clarify an RXName of Caller:RaquelPharmacy Name:[facility]Prescription Name:benralizumab (FASENRA PEN) 30 mg/mL AtInWhat do they need to clarify?:Specifying directions for the LOW DOSE injectable.Best Call Back Number:[phone] Option 1 then Option 6Additional Information: Reference # [id number]   Please call back to advise. Thanks!

## 2025-03-21 NOTE — TELEPHONE ENCOUNTER
----- Message from Carlos sent at 3/21/2025 12:49 PM CDT -----  Contact: july-[facility]  Type:  Pharmacy Calling to Clarify an RXName of Caller:NathaliatanyPharmacy Name:[facility] Name:benral[facility] (FASENRA PEN) 30 mg/mLWhat do they need to clarify?:loading dose for prescriptionBest Call Back Number:[phone] Information: july also stating pt is needing prescription to be filled asap due to pt going out of town soon

## 2025-04-14 ENCOUNTER — TELEPHONE (OUTPATIENT)
Dept: PULMONOLOGY | Facility: CLINIC | Age: 55
End: 2025-04-14
Payer: COMMERCIAL

## 2025-04-14 NOTE — TELEPHONE ENCOUNTER
----- Message from Ilda sent at 4/14/2025 12:11 PM CDT -----  Contact: self  Type:  RX Refill RequestWho Called: PtRefill or New Rx:Refill RX Name and Strength: benralizumab (FASENRA PEN) 30 mg/mL AtInHow is the patient currently taking it? (ex. 1XDay): as directedPreferred Pharmacy with phone number: [facility] [address] [facility] [facility] [address]Anjlq: [phone] Fax: [phone] or Mail Order: Local Ordering Provider: Dr. Rajeev Escobedo Would the patient rather a call back or a response via [facility]? CallBest Call Back Number: [phone] would like the office to call Hectoro [phone]/ pt states [facility] is saying she need a SINA (Pt Level Auth)  Please call to advise... Thank you...

## 2025-04-23 DIAGNOSIS — J82.83 EOSINOPHILIC ASTHMA: ICD-10-CM

## 2025-04-23 DIAGNOSIS — J45.50 SEVERE PERSISTENT ASTHMA WITHOUT COMPLICATION: ICD-10-CM
